# Patient Record
Sex: MALE | Race: ASIAN | Employment: FULL TIME | URBAN - NONMETROPOLITAN AREA
[De-identification: names, ages, dates, MRNs, and addresses within clinical notes are randomized per-mention and may not be internally consistent; named-entity substitution may affect disease eponyms.]

---

## 2018-01-18 ENCOUNTER — OFFICE VISIT (OUTPATIENT)
Dept: ORTHOPEDIC SURGERY | Age: 58
End: 2018-01-18

## 2018-01-18 VITALS
HEART RATE: 68 BPM | WEIGHT: 135 LBS | SYSTOLIC BLOOD PRESSURE: 121 MMHG | DIASTOLIC BLOOD PRESSURE: 78 MMHG | BODY MASS INDEX: 22.49 KG/M2 | HEIGHT: 65 IN

## 2018-01-18 DIAGNOSIS — M79.645 FINGER PAIN, LEFT: Primary | ICD-10-CM

## 2018-01-18 PROCEDURE — 99203 OFFICE O/P NEW LOW 30 MIN: CPT | Performed by: ORTHOPAEDIC SURGERY

## 2018-01-18 RX ORDER — MONTELUKAST SODIUM 10 MG/1
TABLET ORAL
COMMUNITY

## 2018-01-18 NOTE — PROGRESS NOTES
Chief Complaint  Pain (Left index finger)      History of Present Illness:  Austin Briones is a 62 y.o. male , right-hand-dominant, wood  at [employer], presenting with history of left index finger dorsal soft tissue mass. He 1st noticed a soft tissue mass near the dorsal aspect of PIP joint of left index finger approximately August 2017. No pain associated with the mass and minimal irritation and discomfort. Over the last several months and most recently in the last several weeks he has noticed decrease in size of the mass and is now currently approximately 50% smaller than its original size. No associated skin changes or pain or swelling throughout remainder of left index finger or hand the  He does report a history of a ganglion cyst removed from his dorsal left index finger near the proximal phalanx several years ago. No history of cancer, no history of gout or rheumatoid arthritis or other inflammatory arthropathy      Medical History  Patient's medications, allergies, past medical, surgical, social and family histories were reviewed and updated as appropriate. Review of Systems  Pertinent items are noted in HPI  Review of systems reviewed from Patient History Form dated on 1/18/18 and available in the patient's chart under the Media tab. Vital Signs  Vitals:    01/18/18 0837   BP: 121/78   Pulse: 68       General Exam:   Constitutional: Patient is adequately groomed with no evidence of malnutrition  Mental Status: The patient is oriented to time, place and person. The patient's mood and affect are appropriate. Lymphatic: The lymphatic examination bilaterally reveals all areas to be without enlargement or induration. Neurological: The patient has good coordination. There is no weakness or sensory deficit.     Left index Finger/hand Examination  Inspection:  5 mm diameter approximately soft tissue raised area near the dorsal PIP joint dorsal radial aspect  No overlying skin changes, no erythema or fluctuance or drainage  No additional skin lesions or open wounds    Palpation:  Palpable mass soft and mobile, nontender to palpation  No tenderness, crepitus or instability throughout remainder of left index finger or hand    Range of Motion: Full composite fist full extension of all fingers including left index finger with smooth and non-crepitant range of motion     Strength:  5 out of 5 strength with finger extension, FDS and FDP  Negative Danial's test left index finger    Special Tests:  Stable finger at PIP joint to gentle radial ulnar stress with no pain  Gross sensation intact radial ulnar aspect of all fingertips without deficit    Skin: There are no additional worrisome rashes, ulcerations or lesions. Gait: normal nonantalgic gait    Circulation: well perfused, capillary refill brisk in all fingers        Additional Comments:     Additional Examinations:  Right Upper Extremity:  Examination of the right upper extremity does not show any tenderness, deformity or injury. Range of motion is unremarkable. There is no gross instability. There are no rashes, ulcerations or lesions. Strength and tone are normal.      Radiology:     X-rays obtained and reviewed in office:  Views 3  Location left index finger  Impression no acute fracture dislocation, no degenerative changes or other osseous abnormality           Assessment:  59-year-old male presenting with history of left index finger dorsal soft tissue mass  1. Left index finger PIP joint joint dorsal soft tissue mass, possible cyst versus another soft tissue mass  Impression:   Encounter Diagnosis   Name Primary?     Finger pain, left Yes       Office Procedures:  Orders Placed This Encounter   Procedures    XR FINGER LEFT (MIN 2 VIEWS)     30082     Standing Status:   Future     Number of Occurrences:   1     Standing Expiration Date:   1/18/2019     Order Specific Question:   Reason for exam:     Answer:   Pain       Treatment